# Patient Record
Sex: FEMALE | Race: WHITE | NOT HISPANIC OR LATINO | Employment: STUDENT | URBAN - METROPOLITAN AREA
[De-identification: names, ages, dates, MRNs, and addresses within clinical notes are randomized per-mention and may not be internally consistent; named-entity substitution may affect disease eponyms.]

---

## 2022-02-19 ENCOUNTER — ANCILLARY PROCEDURE (OUTPATIENT)
Dept: GENERAL RADIOLOGY | Facility: CLINIC | Age: 22
End: 2022-02-19
Attending: PHYSICIAN ASSISTANT
Payer: COMMERCIAL

## 2022-02-19 ENCOUNTER — OFFICE VISIT (OUTPATIENT)
Dept: URGENT CARE | Facility: URGENT CARE | Age: 22
End: 2022-02-19
Payer: COMMERCIAL

## 2022-02-19 VITALS
OXYGEN SATURATION: 97 % | SYSTOLIC BLOOD PRESSURE: 120 MMHG | TEMPERATURE: 97.5 F | WEIGHT: 231.8 LBS | HEART RATE: 77 BPM | DIASTOLIC BLOOD PRESSURE: 81 MMHG

## 2022-02-19 DIAGNOSIS — S99.912A ANKLE INJURY, LEFT, INITIAL ENCOUNTER: Primary | ICD-10-CM

## 2022-02-19 PROCEDURE — 99203 OFFICE O/P NEW LOW 30 MIN: CPT | Performed by: PHYSICIAN ASSISTANT

## 2022-02-19 PROCEDURE — 73610 X-RAY EXAM OF ANKLE: CPT | Mod: LT | Performed by: RADIOLOGY

## 2022-02-19 RX ORDER — BUPROPION HYDROCHLORIDE 150 MG/1
1 TABLET ORAL DAILY
COMMUNITY
Start: 2021-07-14

## 2022-02-19 ASSESSMENT — ENCOUNTER SYMPTOMS
MYALGIAS: 1
FATIGUE: 0
COLOR CHANGE: 1
BACK PAIN: 0
SORE THROAT: 0
NECK STIFFNESS: 0
FEVER: 0
CHILLS: 0
DIFFICULTY URINATING: 0
WOUND: 0
RHINORRHEA: 0
JOINT SWELLING: 1
ACTIVITY CHANGE: 1
SHORTNESS OF BREATH: 0
ARTHRALGIAS: 1
NECK PAIN: 0
APNEA: 0

## 2022-02-19 NOTE — PROGRESS NOTES
Magdaleno Cerda is a 21 year old who presents for the following health issues   HPI   Musculoskeletal problem/pain  Onset/Duration: x6 days  Description  Location: left ankle   Joint Swelling: yes   Redness: no  Pain: YES, rates pain 2/10  Warmth: no  Intensity:  moderate  Progression of Symptoms:  same  Accompanying signs and symptoms:   Fevers: no  Numbness/tingling/weakness: yes, tingling, numbing and weakness  History  Trauma to the area: yes, someone steps on ankle   Recent illness:  no  Previous similar problem: no  Previous evaluation:  no  Precipitating or alleviating factors:  Aggravating factors include: overuse, walking   Therapies tried and outcome: rest/inactivity, ice, immobilization, Ibuprofen, with minimal relief    There is no problem list on file for this patient.    Current Outpatient Medications   Medication     buPROPion (WELLBUTRIN XL) 150 MG 24 hr tablet     FLUoxetine (PROZAC) 20 MG capsule     No current facility-administered medications for this visit.        Allergies   Allergen Reactions     Sulfa Drugs Anaphylaxis     Zithromax [Azithromycin] Hives       Review of Systems   Constitutional: Positive for activity change. Negative for chills, fatigue and fever.   HENT: Negative for congestion, rhinorrhea and sore throat.    Respiratory: Negative for apnea and shortness of breath.    Cardiovascular: Negative for chest pain.   Genitourinary: Negative for difficulty urinating.   Musculoskeletal: Positive for arthralgias, joint swelling and myalgias. Negative for back pain, gait problem, neck pain and neck stiffness.   Skin: Positive for color change. Negative for pallor, rash and wound.            Objective    /81   Pulse 77   Temp 97.5  F (36.4  C) (Tympanic)   Wt 105.1 kg (231 lb 12.8 oz)   SpO2 97%   There is no height or weight on file to calculate BMI.  Physical Exam  Vitals and nursing note reviewed.   Constitutional:       General: She is not in acute distress.      Appearance: Normal appearance. She is well-developed and normal weight. She is not ill-appearing.   Cardiovascular:      Rate and Rhythm: Normal rate and regular rhythm.      Heart sounds: Normal heart sounds. No murmur heard.    No friction rub. No gallop.   Pulmonary:      Effort: Pulmonary effort is normal. No respiratory distress.      Breath sounds: Normal breath sounds. No stridor. No wheezing, rhonchi or rales.   Chest:      Chest wall: No tenderness.   Musculoskeletal:      Right ankle: Normal. No swelling, ecchymosis or lacerations. No tenderness. Normal range of motion.      Right Achilles Tendon: Normal.      Left ankle: Swelling present. No deformity, ecchymosis or lacerations. Tenderness present over the lateral malleolus and AITF ligament. No medial malleolus, CF ligament, posterior TF ligament or proximal fibula tenderness. Decreased range of motion. Anterior drawer test negative. Normal pulse.      Left Achilles Tendon: Normal.      Right foot: Normal.      Left foot: Normal. Normal range of motion and normal capillary refill. No swelling, deformity, laceration, tenderness, bony tenderness or crepitus. Normal pulse.   Skin:     General: Skin is warm and dry.      Capillary Refill: Capillary refill takes less than 2 seconds.      Comments: Distal pulses are 2+ and symmetric.  No peripheral edema.   Neurological:      Mental Status: She is alert and oriented to person, place, and time.      Sensory: Sensation is intact. No sensory deficit.      Motor: Motor function is intact.      Gait: Gait is intact. Gait normal.      Deep Tendon Reflexes: Reflexes are normal and symmetric.   Psychiatric:         Mood and Affect: Mood normal.         Behavior: Behavior normal.         Thought Content: Thought content normal.         Judgment: Judgment normal.       Results for orders placed or performed in visit on 02/19/22 (from the past 24 hour(s))   XR Ankle Left G/E 3 Views    Narrative    EXAM: XR ANKLE LEFT  G/E 3 VIEWS  LOCATION: [facility]  DATE/TIME: 2/19/2022 11:53 AM    INDICATION: Left ankle pain after injury.  COMPARISON: None.      Impression    IMPRESSION: Normal joint spaces and alignment. No fracture.       Assessment/Plan:  Ankle injury, left, initial encounter:  Xrays are negative for acute fractures or dislocations. Most likely strain/sprain/contusion.  Recommend RICE, walking boot given in clinic, and increase ibuprofen to 600mg prn pain.  Will also send to orthopedics for further evaluation and management.  Recheck in clinic if symptoms worsen or if symptoms do not improve.   -     XR Ankle Left G/E 3 Views  -     Orthopedic  Referral; Future        Physician Attestation   I, Angeli Pichardo, was present with the medical/[employer] student, JOHN Young who participated in the service and in the documentation of the note.  I have verified the history and personally performed the physical exam and medical decision making.  I agree with the assessment and plan of care as documented in the note.      Angeli Pichardo PA-C

## 2023-08-21 ENCOUNTER — OFFICE VISIT (OUTPATIENT)
Dept: URGENT CARE | Facility: URGENT CARE | Age: 23
End: 2023-08-21
Payer: COMMERCIAL

## 2023-08-21 VITALS
HEART RATE: 97 BPM | SYSTOLIC BLOOD PRESSURE: 143 MMHG | TEMPERATURE: 97.4 F | WEIGHT: 187.8 LBS | OXYGEN SATURATION: 100 % | DIASTOLIC BLOOD PRESSURE: 96 MMHG

## 2023-08-21 DIAGNOSIS — L03.116 CELLULITIS OF LEFT LOWER EXTREMITY: Primary | ICD-10-CM

## 2023-08-21 PROCEDURE — 99213 OFFICE O/P EST LOW 20 MIN: CPT | Performed by: FAMILY MEDICINE

## 2023-08-21 RX ORDER — DOXYCYCLINE 100 MG/1
100 CAPSULE ORAL 2 TIMES DAILY
Qty: 14 CAPSULE | Refills: 0 | Status: SHIPPED | OUTPATIENT
Start: 2023-08-21 | End: 2023-08-28

## 2023-08-21 NOTE — PROGRESS NOTES
Assessment:    ICD-10-CM    1. Cellulitis of left lower extremity  L03.116 doxycycline hyclate (VIBRAMYCIN) 100 MG capsule          Anthony Ayala MD ....................  8/21/2023   2:47 PM            CC: infected tattoo    Patient presents with:  Infection: Lt leg, calf. Tattoo a week ago, 2 days ago noticed redness and possible infection.       HPI:  Zaida Mcclendon c/o 2 days of redness of that has started about 7 days after getting a tattoo on her left lower leg.  Denies f/c    PMHx, current meds and allergies reviewed.    Current meds:  buPROPion  FLUoxetine    O:    Vitals:    08/21/23 1444   BP: (!) 143/96   BP Location: Right arm   Cuff Size: Adult Regular   Pulse: 97   Temp: 97.4  F (36.3  C)   TempSrc: Tympanic   SpO2: 100%   Weight: 85.2 kg (187 lb 12.8 oz)     Gen:  A&O x 3, NAD  Ext:  left lower leg with tattoo of butterfly that is erythematous.    Lab  No results found for any visits on 08/21/23.  Diagnosis and treatment options were discussed.

## 2023-10-07 ENCOUNTER — HEALTH MAINTENANCE LETTER (OUTPATIENT)
Age: 23
End: 2023-10-07

## 2024-03-12 ENCOUNTER — APPOINTMENT (OUTPATIENT)
Dept: GENERAL RADIOLOGY | Facility: CLINIC | Age: 24
End: 2024-03-12
Attending: EMERGENCY MEDICINE
Payer: COMMERCIAL

## 2024-03-12 ENCOUNTER — HOSPITAL ENCOUNTER (EMERGENCY)
Facility: CLINIC | Age: 24
Discharge: HOME | End: 2024-03-12
Attending: EMERGENCY MEDICINE | Admitting: EMERGENCY MEDICINE
Payer: COMMERCIAL

## 2024-03-12 VITALS
HEART RATE: 66 BPM | DIASTOLIC BLOOD PRESSURE: 78 MMHG | SYSTOLIC BLOOD PRESSURE: 130 MMHG | OXYGEN SATURATION: 100 % | TEMPERATURE: 98.3 F | RESPIRATION RATE: 16 BRPM

## 2024-03-12 DIAGNOSIS — S52.125A CLOSED NONDISPLACED FRACTURE OF HEAD OF LEFT RADIUS, INITIAL ENCOUNTER: ICD-10-CM
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
## 2024-03-12 PROCEDURE — 73070 X-RAY EXAM OF ELBOW: CPT | Mod: LT

## 2024-03-12 PROCEDURE — G0463 HOSPITAL OUTPT CLINIC VISIT: HCPCS

## 2024-03-12 PROCEDURE — 99213 OFFICE O/P EST LOW 20 MIN: CPT | Performed by: EMERGENCY MEDICINE

## 2024-03-12 PROCEDURE — 73110 X-RAY EXAM OF WRIST: CPT | Mod: LT

## 2024-03-12 PROCEDURE — 73090 X-RAY EXAM OF FOREARM: CPT | Mod: LT

## 2024-03-12 ASSESSMENT — ENCOUNTER SYMPTOMS
WEAKNESS: 0
CHILLS: 0
FEVER: 0
WOUND: 0
NUMBNESS: 0
SHORTNESS OF BREATH: 0
APPETITE CHANGE: 0
ABDOMINAL PAIN: 0
FATIGUE: 0

## 2024-03-12 ASSESSMENT — COLUMBIA-SUICIDE SEVERITY RATING SCALE - C-SSRS
2. HAVE YOU ACTUALLY HAD ANY THOUGHTS OF KILLING YOURSELF IN THE PAST MONTH?: NO
1. IN THE PAST MONTH, HAVE YOU WISHED YOU WERE DEAD OR WISHED YOU COULD GO TO SLEEP AND NOT WAKE UP?: NO
6. HAVE YOU EVER DONE ANYTHING, STARTED TO DO ANYTHING, OR PREPARED TO DO ANYTHING TO END YOUR LIFE?: NO

## 2024-03-12 ASSESSMENT — ACTIVITIES OF DAILY LIVING (ADL): ADLS_ACUITY_SCORE: 35

## 2024-03-13 NOTE — ED PROVIDER NOTES
History   No chief complaint on file.    HPI  Zaida Mcclendon is a 23 year old female with no significant past medical history presenting for evaluation of injury to her left wrist.  Was skateboarding today when she ran into a pinecone which caused her to lose her balance and fall over.  She caught her self with her left wrist causing immediate pain in her wrist and forearm.  Denies striking her head or loss of conscious.  No other injuries.  She did take 3 ibuprofen and 2 Tylenol before coming in and pain is slightly improved.    Allergies:  Allergies   Allergen Reactions    Amoxicillin Hives    Sulfa Antibiotics Anaphylaxis    Zithromax [Azithromycin] Hives       Problem List:    There are no problems to display for this patient.       Past Medical History:    No past medical history on file.    Past Surgical History:    No past surgical history on file.    Family History:    No family history on file.    Social History:  Marital Status:  Single [1]  Social History     Tobacco Use    Smoking status: Never   Substance Use Topics    Alcohol use: No    Drug use: No        Medications:    buPROPion (WELLBUTRIN XL) 150 MG 24 hr tablet  FLUoxetine (PROZAC) 20 MG capsule          Review of Systems   Constitutional:  Negative for appetite change, chills, fatigue and fever.   Respiratory:  Negative for shortness of breath.    Cardiovascular:  Negative for chest pain.   Gastrointestinal:  Negative for abdominal pain.   Musculoskeletal:         Pain in left wrist and forearm   Skin:  Negative for wound.   Neurological:  Negative for weakness and numbness.   All other systems reviewed and are negative.      Physical Exam   BP: 130/78  Pulse: 66  Temp: 98.3  F (36.8  C)  Resp: 16  SpO2: 100 %      Physical Exam  Vitals and nursing note reviewed.   Constitutional:       Appearance: Normal appearance. She is not ill-appearing or diaphoretic.   HENT:      Head: Atraumatic.   Eyes:      Conjunctiva/sclera: Conjunctivae normal.    Cardiovascular:      Rate and Rhythm: Normal rate.      Pulses: Normal pulses.   Pulmonary:      Effort: Pulmonary effort is normal.   Musculoskeletal:      Right elbow: No swelling or deformity. Normal range of motion.      Right forearm: Edema (Mild distal radius) and tenderness (Distal radius as well as mid forearm) present.      Right wrist: Swelling (Mild) and tenderness present. No snuff box tenderness. Decreased range of motion.   Skin:     General: Skin is warm and dry.      Capillary Refill: Capillary refill takes less than 2 seconds.   Neurological:      Mental Status: She is alert and oriented to person, place, and time.   Psychiatric:         Mood and Affect: Mood normal.         ED Course        Procedures                Results for orders placed or performed during the hospital encounter of 03/12/24 (from the past 24 hour(s))   Radius/Ulna XR,  PA &LAT, left    Narrative    EXAM: XR FOREARM LEFT 2 VIEWS, XR WRIST LEFT G/E 3 VIEWS  LOCATION: [facility]  DATE: 3/12/2024    INDICATION: fall skateboarding  COMPARISON: None.      Impression    IMPRESSION: Normal joint spaces and alignment. Suggestion of a small elbow joint effusion without a discrete elbow fracture visualized. Dedicated elbow radiographs would be helpful for further characterization.    No discrete fracture of the left wrist. If there is snuffbox tenderness and high clinical concern for an occult scaphoid fracture conservative management and follow-up radiograph in 7-10 days is suggested.    NOTE: ABNORMAL REPORT    THE DICTATION ABOVE DESCRIBES AN ABNORMALITY FOR WHICH FOLLOW-UP IS NEEDED.    XR Wrist Left G/E 3 Views    Narrative    EXAM: XR FOREARM LEFT 2 VIEWS, XR WRIST LEFT G/E 3 VIEWS  LOCATION: [facility]  DATE: 3/12/2024    INDICATION: fall skateboarding  COMPARISON: None.      Impression    IMPRESSION: Normal joint spaces and alignment. Suggestion of a small elbow joint  effusion without a discrete elbow fracture visualized. Dedicated elbow radiographs would be helpful for further characterization.    No discrete fracture of the left wrist. If there is snuffbox tenderness and high clinical concern for an occult scaphoid fracture conservative management and follow-up radiograph in 7-10 days is suggested.    NOTE: ABNORMAL REPORT    THE DICTATION ABOVE DESCRIBES AN ABNORMALITY FOR WHICH FOLLOW-UP IS NEEDED.    Elbow XR, 2 view, left    Narrative    EXAM: XR ELBOW LEFT 2 VIEWS  LOCATION: [facility]  DATE: 3/12/2024    INDICATION: injury elbow. Pain.  COMPARISON: None.      Impression    IMPRESSION:     There is a nondisplaced radial head fracture with associated elbow joint effusion.      NOTE: ABNORMAL REPORT    THE DICTATION ABOVE DESCRIBES AN ABNORMALITY FOR WHICH FOLLOW-UP IS NEEDED.        Medications - No data to display    8:30 PM Patient re-assessed: Patient advised of the fracture.  Advised of plan for symptomatic treatment with slow return to activity.  Recommend orthopedic follow-up in about a week.    Assessments & Plan (with Medical Decision Making)  23-year-old female presenting for evaluation of left arm and elbow/wrist injury after a fall skateboarding today.  Patient does not normally skateboard and fell injuring her arm.  X-rays identified a radial head fracture.  She has mild pain around the wrist but no pain in the snuffbox to suggest occult fracture here.  Put in his arm sling and advised to gradually return to activity with a plan for orthopedic follow-up in 1 week to assure adequate healing of her fracture.  Patient counseled to avoid further activities such as skateboarding until she is fully healed and to use proper equipment if she is going to do it in the future.     I have reviewed the nursing notes.    I have reviewed the findings, diagnosis, plan and need for follow up with the patient.                 New Prescriptions    No  medications on file       Final diagnoses:   Closed nondisplaced fracture of head of left radius, initial encounter   Fall from skateboard, initial encounter       3/12/2024   [facility] EMERGENCY DEPT       Perez, Anurag Cruz MD  03/12/24 2033

## 2024-04-01 ENCOUNTER — HOSPITAL ENCOUNTER (EMERGENCY)
Facility: CLINIC | Age: 24
Discharge: HOME | End: 2024-04-01
Attending: EMERGENCY MEDICINE | Admitting: EMERGENCY MEDICINE
Payer: COMMERCIAL

## 2024-04-01 ENCOUNTER — APPOINTMENT (OUTPATIENT)
Dept: GENERAL RADIOLOGY | Facility: CLINIC | Age: 24
End: 2024-04-01
Attending: EMERGENCY MEDICINE
Payer: COMMERCIAL

## 2024-04-01 VITALS
TEMPERATURE: 98 F | HEART RATE: 59 BPM | OXYGEN SATURATION: 99 % | SYSTOLIC BLOOD PRESSURE: 121 MMHG | DIASTOLIC BLOOD PRESSURE: 68 MMHG

## 2024-04-01 DIAGNOSIS — S52.125A CLOSED NONDISPLACED FRACTURE OF HEAD OF LEFT RADIUS, INITIAL ENCOUNTER: ICD-10-CM

## 2024-04-01 PROCEDURE — 99283 EMERGENCY DEPT VISIT LOW MDM: CPT | Performed by: EMERGENCY MEDICINE

## 2024-04-01 PROCEDURE — 73080 X-RAY EXAM OF ELBOW: CPT | Mod: LT

## 2024-04-01 ASSESSMENT — ACTIVITIES OF DAILY LIVING (ADL)
ADLS_ACUITY_SCORE: 35
ADLS_ACUITY_SCORE: 35

## 2024-04-01 ASSESSMENT — COLUMBIA-SUICIDE SEVERITY RATING SCALE - C-SSRS
2. HAVE YOU ACTUALLY HAD ANY THOUGHTS OF KILLING YOURSELF IN THE PAST MONTH?: NO
6. HAVE YOU EVER DONE ANYTHING, STARTED TO DO ANYTHING, OR PREPARED TO DO ANYTHING TO END YOUR LIFE?: NO
1. IN THE PAST MONTH, HAVE YOU WISHED YOU WERE DEAD OR WISHED YOU COULD GO TO SLEEP AND NOT WAKE UP?: NO

## 2024-04-01 NOTE — ED PROVIDER NOTES
History   Chief complaint: Recheck of left elbow injury and radial head fracture    HPI  Zaida Mcclendon is a 23 year old female who fell and injured her left elbow 20 days ago with evaluation and left elbow plain films showing a nondisplaced radial head fracture.  She was advised to follow-up in orthopedics/sports medicine clinic and an orthopedic  referral was made for follow-up.  The injury improved significantly and she now has no pain, and with significant improvement she did not feel the need to follow-up as directed and has had no Ortho/sports medicine clinic.  She now presents because she states she was instructed to get follow-up x-rays. She currently has no left elbow pain, swelling, bruising or limitation of function or range of motion.  Left upper extremity CMS/neurovascular function is intact.  During her evaluation here 3/12/2024 she also had a wrist injury with negative wrist plain films and resolution of her wrist pain.  No other acute complaints or concerns.      Previous Records Reviewed:  EXAM: XR ELBOW LEFT 2 VIEWS  LOCATION: [facility]  DATE: 3/12/2024     INDICATION: injury elbow. Pain.                                                                IMPRESSION:   There is a nondisplaced radial head fracture with associated elbow joint effusion.      Allergies:  Allergies   Allergen Reactions    Amoxicillin Hives    Sulfa Antibiotics Anaphylaxis    Zithromax [Azithromycin] Hives       Problem List:    There are no problems to display for this patient.       Past Medical History:    History reviewed. No pertinent past medical history.    Past Surgical History:    History reviewed. No pertinent surgical history.    Family History:    History reviewed. No pertinent family history.    Social History:  Marital Status:  Single [1]  Social History     Tobacco Use    Smoking status: Never   Substance Use Topics    Alcohol use: No    Drug use: No        Medications:         Review of Systems  As mentioned in the HPI, in addition focused review of systems was negative.    Physical Exam   BP: 121/68  Pulse: 59  Temp: 98  F (36.7  C)  SpO2: 99 %      Physical Exam  Vitals and nursing note reviewed.   Constitutional:       General: She is not in acute distress.     Appearance: Normal appearance. She is not ill-appearing.   Cardiovascular:      Rate and Rhythm: Normal rate and regular rhythm.      Pulses: Normal pulses.   Musculoskeletal:         General: No swelling, tenderness or deformity. Normal range of motion.      Left elbow: Normal. No swelling, deformity or effusion. Normal range of motion. No tenderness.      Left wrist: Normal. No swelling, deformity, effusion, tenderness, bony tenderness, snuff box tenderness or crepitus. Normal range of motion. Normal pulse.   Skin:     General: Skin is warm and dry.      Capillary Refill: Capillary refill takes less than 2 seconds.      Coloration: Skin is not pale.      Findings: No bruising, erythema, lesion or rash.   Neurological:      Mental Status: She is alert.      Sensory: No sensory deficit.      Motor: No weakness.   Psychiatric:         Mood and Affect: Mood normal.         Behavior: Behavior normal.         ED Course        Procedures                Results for orders placed or performed during the hospital encounter of 04/01/24 (from the past 24 hour(s))   XR Elbow Left G/E 3 Views    Narrative    EXAM: XR ELBOW LEFT G/E 3 VIEWS  LOCATION: [facility]  DATE: 4/1/2024    INDICATION: Reevaluate left radial head fracture seen on x rays 3 12 2024  COMPARISON: 03/12/2024      Impression    IMPRESSION: Interval resolution of the elbow joint effusion seen on the old study. No displaced fractures identified but there is slightly increased sclerosis traversing the radial neck when compared to prior study which may represent bony remodeling of   a site of a nondisplaced incomplete microtrabecular fracture.        Medications - No data to display    Assessments & Plan (with Medical Decision Making)   23 year old female who fell and injured her left elbow 20 days ago with evaluation and left elbow plain films showing a nondisplaced radial head fracture. She was advised to follow-up in orthopedics/sports medicine clinic and an orthopedic  referral was made for follow-up. The injury improved significantly and she now has no pain, and with significant short-term improvement and she did not feel the need to follow-up as directed and has had no Ortho/sports medicine clinic.  She now presents because she states she was instructed to get follow-up x-rays of the left elbow.  She also injured her left wrist and had negative plain films the same day, 3/12/2024, and but injury has also completely resolved.  She has a normal left elbow examination and her wrist is also nonpainful and nontender. Today left elbow films are unremarkable.  I made an orthopedic  referral for her follow-up for any recurrence of pain or problems with her elbow or wrist.    I have reviewed the nursing notes.    I have reviewed the findings, diagnosis, plan and need for follow up with the patient.      Discharge Medication List as of 4/1/2024  5:43 PM          Final diagnoses:   Closed nondisplaced fracture of head of left radius, initial encounter - Recheck, healing normally, unremarkable plain films today.       4/1/2024   [facility] EMERGENCY DEPT       Pradeep Alvarado MD  04/02/24 1257

## 2024-04-01 NOTE — ED TRIAGE NOTES
Broke radial head of left arm a couple weeks ago and was told to come in for x ray once swelling has gone down.

## 2024-11-24 ENCOUNTER — HEALTH MAINTENANCE LETTER (OUTPATIENT)
Age: 24
End: 2024-11-24